# Patient Record
Sex: FEMALE | Race: WHITE | Employment: FULL TIME | URBAN - NONMETROPOLITAN AREA
[De-identification: names, ages, dates, MRNs, and addresses within clinical notes are randomized per-mention and may not be internally consistent; named-entity substitution may affect disease eponyms.]

---

## 2017-10-23 ENCOUNTER — HOSPITAL ENCOUNTER (EMERGENCY)
Age: 27
Discharge: HOME | End: 2017-10-23
Payer: MEDICARE

## 2017-10-23 VITALS
RESPIRATION RATE: 16 BRPM | BODY MASS INDEX: 37.89 KG/M2 | TEMPERATURE: 98.6 F | DIASTOLIC BLOOD PRESSURE: 88 MMHG | HEART RATE: 70 BPM | WEIGHT: 250 LBS | SYSTOLIC BLOOD PRESSURE: 145 MMHG | OXYGEN SATURATION: 98 % | HEIGHT: 68 IN

## 2017-10-23 DIAGNOSIS — H81.10 BENIGN PAROXYSMAL POSITIONAL VERTIGO, UNSPECIFIED LATERALITY: ICD-10-CM

## 2017-10-23 DIAGNOSIS — J06.9 VIRAL URI: ICD-10-CM

## 2017-10-23 DIAGNOSIS — R11.0 NAUSEA WITHOUT VOMITING: Primary | ICD-10-CM

## 2017-10-23 LAB — PREGNANCY, URINE: NEGATIVE

## 2017-10-23 PROCEDURE — 99203 OFFICE O/P NEW LOW 30 MIN: CPT | Performed by: NURSE PRACTITIONER

## 2017-10-23 PROCEDURE — 99204 OFFICE O/P NEW MOD 45 MIN: CPT

## 2017-10-23 PROCEDURE — 84703 CHORIONIC GONADOTROPIN ASSAY: CPT

## 2017-10-23 RX ORDER — ONDANSETRON 4 MG/1
4 TABLET, ORALLY DISINTEGRATING ORAL EVERY 8 HOURS PRN
Qty: 12 TABLET | Refills: 0 | Status: SHIPPED | OUTPATIENT
Start: 2017-10-23

## 2017-10-23 RX ORDER — FLUTICASONE PROPIONATE 50 MCG
1 SPRAY, SUSPENSION (ML) NASAL DAILY
Qty: 1 BOTTLE | Refills: 0 | Status: SHIPPED | OUTPATIENT
Start: 2017-10-23

## 2017-10-23 ASSESSMENT — PAIN SCALES - GENERAL: PAINLEVEL_OUTOF10: 4

## 2017-10-23 ASSESSMENT — ENCOUNTER SYMPTOMS
ABDOMINAL PAIN: 0
TROUBLE SWALLOWING: 0
FACIAL SWELLING: 0
VOMITING: 0
BLOOD IN STOOL: 0
SHORTNESS OF BREATH: 0
COUGH: 0
NAUSEA: 1
SORE THROAT: 0
RHINORRHEA: 1
BACK PAIN: 1
SINUS PAIN: 0
SINUS PRESSURE: 0

## 2017-10-23 ASSESSMENT — PAIN DESCRIPTION - DESCRIPTORS: DESCRIPTORS: ACHING

## 2017-10-23 ASSESSMENT — PAIN DESCRIPTION - PAIN TYPE: TYPE: ACUTE PAIN

## 2017-10-23 ASSESSMENT — PAIN DESCRIPTION - LOCATION: LOCATION: BACK

## 2017-10-23 NOTE — LETTER
[address] [place] [address] Urgent Care  [address] [place] [address]  ELISSA BROTHERS [facility] [address] [place], [place] [id number]  Phone: [phone]               October 23, 2017    Patient: Samir Oliver   YOB: 1990   Date of Visit: 10/23/2017       To Whom It May Concern:    José Thorne was seen and treated in our emergency department on 10/23/2017. She may return to work on 10/25/17.       Sincerely,       Merline Loser, NP         Signature:__________________________________

## 2017-10-23 NOTE — ED PROVIDER NOTES
Benign paroxysmal positional vertigo, unspecified laterality    3. Viral URI        DISPOSITION/PLAN   DISPOSITION Decision to Discharge     Nontoxic, no acute distress. Urine pregnancy negative. Indications as prescribed. Dizziness secondary to middle ear effusion/URI. Medications as prescribed. Push fluids. Follow-up as needed. If symptoms worsen go to ER. PATIENT REFERRED TO:  Vanessa Milligan MD  [address]  [facility]  [phone]    In 1 week  Follow up if no better. Medications as prescribed. Slow movements. Diet as tolerated. If worse go to ER.     DISCHARGE MEDICATIONS:  New Prescriptions    FLUTICASONE (FLONASE) 50 MCG/ACT NASAL SPRAY    1 spray by Nasal route daily    ONDANSETRON (ZOFRAN ODT) 4 MG DISINTEGRATING TABLET    Take 1 tablet by mouth every 8 hours as needed for Nausea or Vomiting     Current Discharge Medication List          RUSS Oliver NP  10/23/17 2002

## 2017-10-23 NOTE — ED TRIAGE NOTES
To room with c/o nasal congestion that started last Monday. Dizziness started Saturday with some nausea. Bilateral ear pressure.

## 2017-10-23 NOTE — LETTER
[address] [place] [address] Urgent Care  [address] [place], [place] [address]KT DENEEN GONZALEZ [facility] [address]  Phone: [phone]               October 23, 2017    Patient: Christy Howard   YOB: 1990   Date of Visit: 10/23/2017       To Whom It May Concern:    Alberto Valenzuela was seen and treated in our emergency department on 10/23/2017. She may return to work on 10/24/17.       Sincerely,       Candi Delacruz NP         Signature:__________________________________

## 2021-11-04 ENCOUNTER — APPOINTMENT (OUTPATIENT)
Dept: LABOR AND DELIVERY | Age: 31
End: 2021-11-04
Payer: MEDICARE

## 2021-11-04 ENCOUNTER — HOSPITAL ENCOUNTER (OUTPATIENT)
Age: 31
Discharge: HOME | End: 2021-11-04
Attending: OBSTETRICS & GYNECOLOGY | Admitting: OBSTETRICS & GYNECOLOGY
Payer: MEDICARE

## 2021-11-04 VITALS
SYSTOLIC BLOOD PRESSURE: 134 MMHG | OXYGEN SATURATION: 98 % | HEART RATE: 75 BPM | TEMPERATURE: 97.5 F | DIASTOLIC BLOOD PRESSURE: 70 MMHG | RESPIRATION RATE: 22 BRPM

## 2021-11-04 PROBLEM — O99.891 PREGNANCY COMPLICATION BEFORE BIRTH: Status: ACTIVE | Noted: 2021-11-04

## 2021-11-04 PROCEDURE — 59025 FETAL NON-STRESS TEST: CPT

## 2021-11-04 NOTE — FLOWSHEET NOTE
Gr 3 p 2 admitted to labor and delivery for NST for gest diabetes. States baby is moving well. EFM applied to soft non tender abdomen.

## 2021-11-20 NOTE — PROCEDURES
Department of Obstetrics and Gynecology  Labor and Delivery   Triage Note        Pt Name: Adama Stark  MRN: [id number] [place] #: [de-identified]  YOB: 1990      SUBJECTIVE: This patient presented at 35 weeks gestation for a scheduled NST due to GDM    OBJECTIVE    Vitals:  /70   Pulse 75   Temp 97.5 °F (36.4 °C)   Resp 22   SpO2 98%     Fetal heart rate:         Baseline Heart Rate:  150        Accelerations:  present       Decelerations:  none       Variability:  moderate    Contraction frequency: none    The NST was reactive        ASSESSMENT & PLAN:  Discharged to home in a stable condition and instructed to follow up at her next scheduled appointment.     Keenan Corey DO D.O.

## 2023-08-30 LAB
BILIRUBIN URINE: NEGATIVE
BLOOD, URINE: NORMAL
CLARITY: CLEAR
COLOR, URINE: YELLOW
GLUCOSE URINE: NEGATIVE MG/DL
KETONES, URINE: NEGATIVE MG/DL
LEUKOCYTES, UA: NORMAL
NITRITE, URINE: NEGATIVE
PH, URINE: 6 (ref 5–8)
PROTEIN, URINE: 30 MG/DL
SPECIFIC GRAVITY UA: 1.02 (ref 1.01–1.02)
UROBILINOGEN, URINE: 0.2 E.U./DL (ref 0.2–1)

## 2023-09-01 LAB — URINE CULTURE, ROUTINE: NORMAL

## 2023-09-26 LAB
BILIRUBIN URINE: NEGATIVE
BLOOD, URINE: NORMAL
CLARITY: CLEAR
COLOR, URINE: YELLOW
GLUCOSE URINE: NEGATIVE MG/DL
KETONES, URINE: NEGATIVE MG/DL
LEUKOCYTES, UA: NORMAL
NITRITE, URINE: NEGATIVE
PH, URINE: 6 (ref 5–8)
PROTEIN, URINE: NEGATIVE MG/DL
SPECIFIC GRAVITY UA: <=1.005 (ref 1.01–1.02)
UROBILINOGEN, URINE: 0.2 E.U./DL (ref 0.2–1)